# Patient Record
Sex: FEMALE | Race: WHITE | NOT HISPANIC OR LATINO | URBAN - METROPOLITAN AREA
[De-identification: names, ages, dates, MRNs, and addresses within clinical notes are randomized per-mention and may not be internally consistent; named-entity substitution may affect disease eponyms.]

---

## 2020-06-02 ENCOUNTER — EVALUATION (OUTPATIENT)
Dept: PHYSICAL THERAPY | Facility: CLINIC | Age: 20
End: 2020-06-02
Payer: OTHER GOVERNMENT

## 2020-06-02 ENCOUNTER — TRANSCRIBE ORDERS (OUTPATIENT)
Dept: PHYSICAL THERAPY | Facility: CLINIC | Age: 20
End: 2020-06-02

## 2020-06-02 DIAGNOSIS — S46.011D STRAIN OF TENDON OF RIGHT ROTATOR CUFF, SUBSEQUENT ENCOUNTER: Primary | ICD-10-CM

## 2020-06-02 PROCEDURE — 97161 PT EVAL LOW COMPLEX 20 MIN: CPT | Performed by: PHYSICAL THERAPIST

## 2020-06-02 PROCEDURE — 97110 THERAPEUTIC EXERCISES: CPT | Performed by: PHYSICAL THERAPIST

## 2020-06-05 ENCOUNTER — OFFICE VISIT (OUTPATIENT)
Dept: PHYSICAL THERAPY | Facility: CLINIC | Age: 20
End: 2020-06-05
Payer: OTHER GOVERNMENT

## 2020-06-05 DIAGNOSIS — S46.011D STRAIN OF TENDON OF RIGHT ROTATOR CUFF, SUBSEQUENT ENCOUNTER: Primary | ICD-10-CM

## 2020-06-05 PROCEDURE — 97110 THERAPEUTIC EXERCISES: CPT | Performed by: PHYSICAL THERAPIST

## 2020-06-10 ENCOUNTER — OFFICE VISIT (OUTPATIENT)
Dept: PHYSICAL THERAPY | Facility: CLINIC | Age: 20
End: 2020-06-10
Payer: OTHER GOVERNMENT

## 2020-06-10 DIAGNOSIS — S46.011D STRAIN OF TENDON OF RIGHT ROTATOR CUFF, SUBSEQUENT ENCOUNTER: Primary | ICD-10-CM

## 2020-06-10 PROCEDURE — 97110 THERAPEUTIC EXERCISES: CPT | Performed by: PHYSICAL THERAPIST

## 2020-06-12 ENCOUNTER — APPOINTMENT (OUTPATIENT)
Dept: PHYSICAL THERAPY | Facility: CLINIC | Age: 20
End: 2020-06-12
Payer: OTHER GOVERNMENT

## 2020-06-15 ENCOUNTER — OFFICE VISIT (OUTPATIENT)
Dept: PHYSICAL THERAPY | Facility: CLINIC | Age: 20
End: 2020-06-15
Payer: OTHER GOVERNMENT

## 2020-06-15 DIAGNOSIS — S46.011D STRAIN OF TENDON OF RIGHT ROTATOR CUFF, SUBSEQUENT ENCOUNTER: Primary | ICD-10-CM

## 2020-06-15 PROCEDURE — 97110 THERAPEUTIC EXERCISES: CPT | Performed by: PHYSICAL THERAPIST

## 2020-06-19 ENCOUNTER — OFFICE VISIT (OUTPATIENT)
Dept: PHYSICAL THERAPY | Facility: CLINIC | Age: 20
End: 2020-06-19
Payer: OTHER GOVERNMENT

## 2020-06-19 DIAGNOSIS — S46.011D STRAIN OF TENDON OF RIGHT ROTATOR CUFF, SUBSEQUENT ENCOUNTER: Primary | ICD-10-CM

## 2020-06-19 PROCEDURE — 97110 THERAPEUTIC EXERCISES: CPT | Performed by: PHYSICAL THERAPIST

## 2020-06-23 ENCOUNTER — OFFICE VISIT (OUTPATIENT)
Dept: PHYSICAL THERAPY | Facility: CLINIC | Age: 20
End: 2020-06-23
Payer: OTHER GOVERNMENT

## 2020-06-23 DIAGNOSIS — S46.011D STRAIN OF TENDON OF RIGHT ROTATOR CUFF, SUBSEQUENT ENCOUNTER: Primary | ICD-10-CM

## 2020-06-23 PROCEDURE — 97110 THERAPEUTIC EXERCISES: CPT | Performed by: PHYSICAL THERAPIST

## 2020-06-25 ENCOUNTER — OFFICE VISIT (OUTPATIENT)
Dept: PHYSICAL THERAPY | Facility: CLINIC | Age: 20
End: 2020-06-25
Payer: OTHER GOVERNMENT

## 2020-06-25 DIAGNOSIS — S46.011D STRAIN OF TENDON OF RIGHT ROTATOR CUFF, SUBSEQUENT ENCOUNTER: Primary | ICD-10-CM

## 2020-06-25 PROCEDURE — 97110 THERAPEUTIC EXERCISES: CPT | Performed by: PHYSICAL THERAPIST

## 2020-06-30 ENCOUNTER — OFFICE VISIT (OUTPATIENT)
Dept: PHYSICAL THERAPY | Facility: CLINIC | Age: 20
End: 2020-06-30
Payer: OTHER GOVERNMENT

## 2020-06-30 DIAGNOSIS — S46.011D STRAIN OF TENDON OF RIGHT ROTATOR CUFF, SUBSEQUENT ENCOUNTER: Primary | ICD-10-CM

## 2020-06-30 PROCEDURE — 97110 THERAPEUTIC EXERCISES: CPT | Performed by: PHYSICAL THERAPIST

## 2020-07-02 ENCOUNTER — OFFICE VISIT (OUTPATIENT)
Dept: PHYSICAL THERAPY | Facility: CLINIC | Age: 20
End: 2020-07-02
Payer: OTHER GOVERNMENT

## 2020-07-02 DIAGNOSIS — S46.011D STRAIN OF TENDON OF RIGHT ROTATOR CUFF, SUBSEQUENT ENCOUNTER: Primary | ICD-10-CM

## 2020-07-02 PROCEDURE — 97110 THERAPEUTIC EXERCISES: CPT | Performed by: PHYSICAL THERAPIST

## 2020-07-02 NOTE — PROGRESS NOTES
Daily Note     Today's date: 2020  Patient name: Ramón Mcgraw  : 2000  MRN: [id number]  Referring provider: Juancho Bower MD  Dx:   Encounter Diagnosis     ICD-10-CM    1  Strain of tendon of right rotator cuff, subsequent encounter S46 011D                   Subjective: Harrison Mejia tells me that his week was the best week she has had with her shoulder (as far as pain go) since starting therapy  She reports HEP compliance  Objective: See treatment diary below  Progressed UE strengthening program         Assessment: Responding well to current plan     Plan: Continue per plan of care  Likely one more week of therapy         Precautions: none      Manuals  6/10 6/15 6/19 6/23 6/25 6/30 7/2                                                        Neuro Re-Ed                                                                                                        Ther Ex             UBE NV 3/3 3/3 3/3 3/3 L4 5 3/3  L5 3/3  L6 3/3  L6 3/3  L6    Scap 4 with Band NV Green  2 x 10  3sec Green  3 x 10 Blue 2 x 15 Blue  2 x 15 Blue  2 x 15 Blue  2 x 15 Blue  2 x 15 Blue  2 x 15                 Prone rhomboids NV 3 x 10 Over Pball  3 x 10 1#  Pball  2 x 15 1#Pball  2 x 15 1# Pball  3 x 10 1#Pball  3 x 10 1#  Pball  2 x 15 2#  2 x 15    Prone lower traps NV 3 x 10 Over Pball  3 x 10 1#  Pball  2 x 15 1#Pball  2 x 15 1#  Pball  3 x 10 1#  Pball  3 x 10 1#  Pball  2 x 15 2#  2 x 15    SA Punchups NV 20#  2 x 20 10#  2 x 20 10#  2 x 20 15#  2 x 20 15#  2 x 20 15#  2 x 20 20#  2 x 20 20#  2 x 20    Rhythmic stabilizations  FF/EXT, Horiz ABD/ADD NV 3 x 10 3 x 10 3 x 10 3 x 10 3 x 10 3 x 10 3 x 10     SL shoulder flexion (slow)  2 x 10 1#  2 x 15 1#  2 x 15 2#  2 x 10 2#  2 x 15 2#  2 x 15 2#  3 x 10 2#  3 x 10    SL ER  2#  2 x 15 2#  2 x 15 2#  2 x 15 3#  2 x 10 3#  3 x 10 3#  3 x 10 4#  3 x 10 4#  3 z 10    Manual resisted supine PNF D2 flexion/extension   3 x 10 3 x 10 3 x 10 3 x 10 3 x 10 3 x 10     Tband IR   Green  X 30 Blue x 30 Blue  X 30 Blue  X 30 Blue  3 x 10 Blue 2 x 15 Blue  2 x 15    Tband ER   Green x 30 Blue x 30 Blue x 30 Blue   x 30 Blue  3 x 10 Blue  2 x 15 Blue  2 x 15    Body blade (H/V @ 90 flexion)   Small:  15sec x 3 Large:  15sec x 3 Large 15sec x 4 Large  15sec x 4 Large  15sec x 4 Large 15  sec x 4 Large 15sec x 4    SA wall walks   Orange  X 10 Orange   x 10 Orange  X 1 0 Orange   x 10 Orange x 10 Pink  2 x 10 Pink  2 x 10    Ball circles @ wall   1# on wrist  3 x 10 1 5# on wrist  20cw/ccw x 4 1 5#  On wrist   20cw/ccw x 4 1 5# on wrist  20/cw/ccw x 3 2#   on wrist 20cw/ccw x 3 23 on wrist, 20cw/ccw x 3 2 5# on wirst  20cw/ccw x 3    AROM scaption   1#  3 x 10 2#  3x 10 3#  3 x 10 3#  3 x 10 3#  3 x 10 3#  3 x 10 4#  3 x 10    Jose PARADISE ER With 3 side steps     6 0   x 10 6 0 x 10 min 6 5   2 x10 7 0  2 x 10 7 5  2 x 10    BOSU wall pushups      NV 2 x 20 2 x 20 2 x 20                 Ther Activity                                       IE/HEP RG                                      Modalities             Cryo R shoulder 10 min 10 min 10 min 10 min 10 min 10 min 10 min 10 min

## 2020-07-07 ENCOUNTER — OFFICE VISIT (OUTPATIENT)
Dept: PHYSICAL THERAPY | Facility: CLINIC | Age: 20
End: 2020-07-07
Payer: OTHER GOVERNMENT

## 2020-07-07 ENCOUNTER — TRANSCRIBE ORDERS (OUTPATIENT)
Dept: PHYSICAL THERAPY | Facility: CLINIC | Age: 20
End: 2020-07-07

## 2020-07-07 DIAGNOSIS — S46.011D STRAIN OF TENDON OF RIGHT ROTATOR CUFF, SUBSEQUENT ENCOUNTER: Primary | ICD-10-CM

## 2020-07-07 PROCEDURE — 97110 THERAPEUTIC EXERCISES: CPT | Performed by: PHYSICAL THERAPIST

## 2020-07-07 NOTE — LETTER
2020    MD Zackary [address]orman [address] [place] [address]    Patient: Guillermina Blandon   YOB: 2000   Date of Visit: 2020     Encounter Diagnosis     ICD-10-CM    1  Strain of tendon of right rotator cuff, subsequent encounter S46 011D        Dear Dr Anh Rivera: Thank you for your recent referral of Guillermina Blandon  Please review the attached evaluation summary from Sprague's recent visit  Please verify that you agree with the plan of care by signing the attached order  If you have any questions or concerns, please do not hesitate to call  I sincerely appreciate the opportunity to share in the care of one of your patients and hope to have another opportunity to work with you in the near future  Sincerely,    Beck Mcmanus, PT      Referring Provider:      I certify that I have read the below Plan of Care and certify the need for these services furnished under this plan of treatment while under my care  MD Zackary Gorman [address] [facility] 109: [phone]                                                       Discharge  Today's date: 2020  Patient name: Guillermina Blandon  : 2000  MRN: [id number]  Referring provider: Juwan Arrington MD  Dx:   Encounter Diagnosis     ICD-10-CM    1  Strain of tendon of right rotator cuff, subsequent encounter S46 011D                Assessment  Assessment details: Patient is a 22 y o  female with a diagnosis of R shoulder secondary rotator cuff impingement with possible  labral pathology  Since starting therapy pt has made the following progress towards goals:  1) Decreased pain  2) Improved range of motion  3) Improved strength  4) Improved self rated functional score(FOTO score improved to 75 from 63 at time of IE)    Making good progress, still with some likely functional weakness which she will address with continued exercise   I advised patient to continue with home exercise program which I reviewed with them today  Advised patient to contact me with any future questions or concerns regarding exercise  Pt is in agreement with discharge plan  Pt will be returning to school at the end of the week  Understanding of Dx/Px/POC: excellent  Goals  Short Term Goals:  1) Pain : Decrease R shoulder pain to 2/10 at worst x 1 continuous week within 2-3 weeks  2) AROM: Improve R shoulder AROM flexion to al least 170 degrees within 2-3 weeks  3) Strength: Improved UE strength by at least 1/2 grade for all noted as weak within 2-3 weeks  4) Function: Improved FOTO score from IE within 2-3 weeks (63 at time of IE), patient to note greater use of arm for ADLs within 2-3 weeks  LongTerm Goals:  1) Pain : Eliminate R shoulder pain  x 1 continuous week within 4-6 weeks  2) Strength: Improved R  UE strength to 5/5 within 6 weeks  3) Function: Improved FOTO score to at least  77 ; no reported difficulty with ADLs within 6 weeks  [place] 4) (I) with HEP within 6 weeks  Plan  Patient would benefit from: skilled PT  Planned modality interventions: cryotherapy, TENS, thermotherapy: hydrocollator packs and ultrasound  Planned therapy interventions: ADL training, body mechanics training, functional ROM exercises, home exercise program, joint mobilization, manual therapy, neuromuscular re-education, patient education, postural training, strengthening, stretching, therapeutic activities and therapeutic exercise  Frequency: 2-3 x's per week x 6 weeks  Plan of Care beginning date: 6/2/2020  Plan of Care expiration date: 7/14/2020  Treatment plan discussed with: patient        Subjective Evaluation    History of Present Illness  Mechanism of injury: Patient is a 22 y o  old female who presents for an initial outpatient physical therapy consultation regarding their R shoulder      Pain started about four years ago 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  Off and on since that time  Candy Garcia currently attend [employer]  Was doing a combating class last semester and pain started to worsen, then ran an obstacle course test in late February and she felt like shoulder gave out while on monkey bars  Consistent pain since that time  MRI + for inflammation around labrum without clear large tear per MD note  Received injection from Dr Analia Monahan which has been helpful  Npw referred for course of therapy  Pain is localized anteriorly  UPDATE 20: "Getting better " Noting near resolution of R shoulder pain over the past two weeks  Feeling stronger  Still has some concerns about how well she will be able to do pushups with return to physical testing/training in [employer]         Pain  Current pain ratin  At best pain ratin  At worst pain ratin    Social Support    Employment status: not working  Hand dominance: left    Patient Goals: making good progress towards personal goals  Patient goals for therapy: decreased pain, increased strength and independence with ADLs/IADLs          Objective     Tenderness     Right Shoulder  NO Tenderness in the biceps tendon (proximal) or supraspinatus tendon  Active Range of Motion       Right Shoulder   Flexion: 170 degrees  Abduction: 170 degrees   External rotation: 90 degrees  Internal rotation: T7     Passive Range of Motion   R shoulder: Full and pain free     Strength/Myotome Testing     Right Shoulder     Planes of Motion   Flexion: 5   Extension: 5   Abduction: 5   Adduction: 5   External rotation at 0°:  5  Internal rotation at 0°: 5     Isolated Muscles   Biceps: 5   Lower trapezius: 5  Rhomboids: 5 (pain)   Triceps: 5     Tests     Right Shoulder   Negative crossover, empty can and Speed's  Negative apprehension, drop arm and lift-off                Precautions: none       Precautions: none      Manuals 6/2 6/5 6/10 6/15 6/19 6/23 6/25 6/30 7/2 7/6 Neuro Re-Ed                                                                                                        Ther Ex             UBE NV 3/3 3/3 3/3 3/3 L4 5 3/3  L5 3/3  L6 3/3  L6 3/3  L6 3/3  L6   Scap 4 with Band NV Green  2 x 10  3sec Green  3 x 10 Blue 2 x 15 Blue  2 x 15 Blue  2 x 15 Blue  2 x 15 Blue  2 x 15 Blue  2 x 15 Blue  2 x 15                Prone rhomboids NV 3 x 10 Over Pball  3 x 10 1#  Pball  2 x 15 1#Pball  2 x 15 1# Pball  3 x 10 1#Pball  3 x 10 1#  Pball  2 x 15 2#  2 x 15 2#  2 x 15   Prone lower traps NV 3 x 10 Over Pball  3 x 10 1#  Pball  2 x 15 1#Pball  2 x 15 1#  Pball  3 x 10 1#  Pball  3 x 10 1#  Pball  2 x 15 2#  2 x 15 2#  2 x 15   SA Punchups NV 20#  2 x 20 10#  2 x 20 10#  2 x 20 15#  2 x 20 15#  2 x 20 15#  2 x 20 20#  2 x 20 20#  2 x 20 20#  2 x 20   Rhythmic stabilizations  FF/EXT, Horiz ABD/ADD NV 3 x 10 3 x 10 3 x 10 3 x 10 3 x 10 3 x 10 3 x 10     SL shoulder flexion (slow)  2 x 10 1#  2 x 15 1#  2 x 15 2#  2 x 10 2#  2 x 15 2#  2 x 15 2#  3 x 10 2#  3 x 10 2#  2 x 15   SL ER  2#  2 x 15 2#  2 x 15 2#  2 x 15 3#  2 x 10 3#  3 x 10 3#  3 x 10 4#  3 x 10 4#  3 z 10 4#  3 x 10   Manual resisted supine PNF D2 flexion/extension   3 x 10 3 x 10 3 x 10 3 x 10 3 x 10 3 x 10     Tband IR   Green  X 30 Blue x 30 Blue  X 30 Blue  X 30 Blue  3 x 10 Blue 2 x 15 Blue  2 x 15 Blue  2 x 15   Tband ER   Green x 30 Blue x 30 Blue x 30 Blue   x 30 Blue  3 x 10 Blue  2 x 15 Blue  2 x 15 Blue  2 x 15   Body blade (H/V @ 90 flexion)   Small:  15sec x 3 Large:  15sec x 3 Large 15sec x 4 Large  15sec x 4 Large  15sec x 4 Large 15  sec x 4 Large 15sec x 4 Large  15sec x 4   SA wall walks   Orange  X 10 Orange   x 10 Orange  X 1 0 Orange   x 10 Orange x 10 Pink  2 x 10 Pink  2 x 10 Pink  2 x 10   Ball circles @ wall   1# on wrist  3 x 10 1 5# on wrist  20cw/ccw x 4 1 5#  On wrist   20cw/ccw x 4 1 5# on wrist  20/cw/ccw x 3 2#   on wrist 20cw/ccw x 3 23 on wrist, 20cw/ccw x 3 2 5# on wirst  20cw/ccw x 3 2 5# on wrist  20cw/ccw x  4   AROM scaption   1#  3 x 10 2#  3x 10 3#  3 x 10 3#  3 x 10 3#  3 x 10 3#  3 x 10 4#  3 x 10 4#  3 x 10   [place] PARADISE ER With 3 side steps     6 0   x 10 6 0 x 10 min 6 5   2 x10 7 0  2 x 10 7 5  2 x 10 7 5  2 x 10   BOSU wall pushups      NV 2 x 20 2 x 20 2 x 20 2 x 20                Ther Activity                                       IE/HEP RG                                      Modalities             Cryo R shoulder 10 min 10 min 10 min 10 min 10 min 10 min 10 min 10 min  10 min

## 2020-07-07 NOTE — PROGRESS NOTES
Discharge  Today's date: 2020  Patient name: Mecca Chandler  : 2000  MRN: [id number]  Referring provider: Dean Clifford MD  Dx:   Encounter Diagnosis     ICD-10-CM    1  Strain of tendon of right rotator cuff, subsequent encounter S46 011D                Assessment  Assessment details: Patient is a 22 y o  female with a diagnosis of R shoulder secondary rotator cuff impingement with possible  labral pathology  Since starting therapy pt has made the following progress towards goals:  1) Decreased pain  2) Improved range of motion  3) Improved strength  4) Improved self rated functional score(FOTO score improved to 75 from 63 at time of IE)    Making good progress, still with some likely functional weakness which she will address with continued exercise  I advised patient to continue with home exercise program which I reviewed with them today  Advised patient to contact me with any future questions or concerns regarding exercise  Pt is in agreement with discharge plan  Pt will be returning to school at the end of the week  Understanding of Dx/Px/POC: excellent  Goals  Short Term Goals:  1) Pain : Decrease R shoulder pain to 2/10 at worst x 1 continuous week within 2-3 weeks  2) AROM: Improve R shoulder AROM flexion to al least 170 degrees within 2-3 weeks  3) Strength: Improved UE strength by at least 1/2 grade for all noted as weak within 2-3 weeks  4) Function: Improved FOTO score from IE within 2-3 weeks (63 at time of IE), patient to note greater use of arm for ADLs within 2-3 weeks  LongTerm Goals:  1) Pain : Eliminate R shoulder pain  x 1 continuous week within 4-6 weeks  2) Strength: Improved R  UE strength to 5/5 within 6 weeks  3) Function: Improved FOTO score to at least  77 ; no reported difficulty with ADLs within 6 weeks  [address] [place] 4) (I) with HEP within 6 weeks          Plan  Patient would benefit from: skilled PT  Planned modality interventions: cryotherapy, TENS, thermotherapy: hydrocollator packs and ultrasound  Planned therapy interventions: ADL training, body mechanics training, functional ROM exercises, home exercise program, joint mobilization, manual therapy, neuromuscular re-education, patient education, postural training, strengthening, stretching, therapeutic activities and therapeutic exercise  Frequency: 2-3 x's per week x 6 weeks  Plan of Care beginning date: 2020  Plan of Care expiration date: 2020  Treatment plan discussed with: patient        Subjective Evaluation    History of Present Illness  Mechanism of injury: Patient is a 22 y o  old female who presents for an initial outpatient physical therapy consultation regarding their R shoulder  Pain started about four years ago 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  Off and on since that time  Rose Pisano currently attend [employer]  Was doing a combating class last semester and pain started to worsen, then ran an obstacle course test in late February and she felt like shoulder gave out while on monkey bars  Consistent pain since that time  MRI + for inflammation around labrum without clear large tear per MD note  Received injection from Dr Claretta Lesches which has been helpful  Npw referred for course of therapy  Pain is localized anteriorly  UPDATE 20: "Getting better " Noting near resolution of R shoulder pain over the past two weeks  Feeling stronger  Still has some concerns about how well she will be able to do pushups with return to physical testing/training in [employer]         Pain  Current pain ratin  At best pain ratin  At worst pain ratin    Social Support    Employment status: not working  Hand dominance: left    Patient Goals: making good progress towards personal goals    Patient goals for therapy: decreased pain, increased strength and independence with ADLs/IADLs          Objective Tenderness     Right Shoulder  NO Tenderness in the biceps tendon (proximal) or supraspinatus tendon  Active Range of Motion       Right Shoulder   Flexion: 170 degrees  Abduction: 170 degrees   External rotation: 90 degrees  Internal rotation: T7     Passive Range of Motion   R shoulder: Full and pain free     Strength/Myotome Testing     Right Shoulder     Planes of Motion   Flexion: 5   Extension: 5   Abduction: 5   Adduction: 5   External rotation at 0°: 5  Internal rotation at 0°: 5     Isolated Muscles   Biceps: 5   Lower trapezius: 5  Rhomboids: 5 (pain)   Triceps: 5     Tests     Right Shoulder   Negative crossover, empty can and Speed's  Negative apprehension, drop arm and lift-off                Precautions: none       Precautions: none      Manuals 6/2 6/5 6/10 6/15 6/19 6/23 6/25 6/30 7/2 7/6                                                       Neuro Re-Ed                                                                                                        Ther Ex             UBE NV 3/3 3/3 3/3 3/3 L4 5 3/3  L5 3/3  L6 3/3  L6 3/3  L6 3/3  L6   Scap 4 with Band NV Green  2 x 10  3sec Green  3 x 10 Blue 2 x 15 Blue  2 x 15 Blue  2 x 15 Blue  2 x 15 Blue  2 x 15 Blue  2 x 15 Blue  2 x 15                Prone rhomboids NV 3 x 10 Over Pball  3 x 10 1#  Pball  2 x 15 1#Pball  2 x 15 1# Pball  3 x 10 1#Pball  3 x 10 1#  Pball  2 x 15 2#  2 x 15 2#  2 x 15   Prone lower traps NV 3 x 10 Over Pball  3 x 10 1#  Pball  2 x 15 1#Pball  2 x 15 1#  Pball  3 x 10 1#  Pball  3 x 10 1#  Pball  2 x 15 2#  2 x 15 2#  2 x 15   SA Punchups NV 20#  2 x 20 10#  2 x 20 10#  2 x 20 15#  2 x 20 15#  2 x 20 15#  2 x 20 20#  2 x 20 20#  2 x 20 20#  2 x 20   Rhythmic stabilizations  FF/EXT, Horiz ABD/ADD NV 3 x 10 3 x 10 3 x 10 3 x 10 3 x 10 3 x 10 3 x 10     SL shoulder flexion (slow)  2 x 10 1#  2 x 15 1#  2 x 15 2#  2 x 10 2#  2 x 15 2#  2 x 15 2#  3 x 10 2#  3 x 10 2#  2 x 15   SL ER  2#  2 x 15 2#  2 x 15 2#  2 x 15 3#  2 x 10 3#  3 x 10 3#  3 x 10 4#  3 x 10 4#  3 z 10 4#  3 x 10   Manual resisted supine PNF D2 flexion/extension   3 x 10 3 x 10 3 x 10 3 x 10 3 x 10 3 x 10     Tband IR   Green  X 30 Blue x 30 Blue  X 30 Blue  X 30 Blue  3 x 10 Blue 2 x 15 Blue  2 x 15 Blue  2 x 15   Tband ER   Green x 30 Blue x 30 Blue x 30 Blue   x 30 Blue  3 x 10 Blue  2 x 15 Blue  2 x 15 Blue  2 x 15   Body blade (H/V @ 90 flexion)   Small:  15sec x 3 Large:  15sec x 3 Large 15sec x 4 Large  15sec x 4 Large  15sec x 4 Large 15  sec x 4 Large 15sec x 4 Large  15sec x 4   SA wall walks   Orange  X 10 Orange   x 10 Orange  X 1 0 Orange   x 10 Orange x 10 Pink  2 x 10 Pink  2 x 10 Pink  2 x 10   Ball circles @ wall   1# on wrist  3 x 10 1 5# on wrist  20cw/ccw x 4 1 5#  On wrist   20cw/ccw x 4 1 5# on wrist  20/cw/ccw x 3 2#   on wrist 20cw/ccw x 3 23 on wrist, 20cw/ccw x 3 2 5# on wirst  20cw/ccw x 3 2 5# on wrist  20cw/ccw x  4   AROM scaption   1#  3 x 10 2#  3x 10 3#  3 x 10 3#  3 x 10 3#  3 x 10 3#  3 x 10 4#  3 x 10 4#  3 x 10   Jose PARADISE ER With 3 side steps     6 0   x 10 6 0 x 10 min 6 5   2 x10 7 0  2 x 10 7 5  2 x 10 7 5  2 x 10   BOSU wall pushups      NV 2 x 20 2 x 20 2 x 20 2 x 20                Ther Activity                                       IE/HEP RG                                      Modalities             Cryo R shoulder 10 min 10 min 10 min 10 min 10 min 10 min 10 min 10 min  10 min

## 2021-01-07 ENCOUNTER — NURSE TRIAGE (OUTPATIENT)
Dept: OTHER | Facility: OTHER | Age: 21
End: 2021-01-07

## 2021-01-07 DIAGNOSIS — Z20.828 SARS-ASSOCIATED CORONAVIRUS EXPOSURE: ICD-10-CM

## 2021-01-07 DIAGNOSIS — Z20.828 SARS-ASSOCIATED CORONAVIRUS EXPOSURE: Primary | ICD-10-CM

## 2021-01-07 PROCEDURE — U0003 INFECTIOUS AGENT DETECTION BY NUCLEIC ACID (DNA OR RNA); SEVERE ACUTE RESPIRATORY SYNDROME CORONAVIRUS 2 (SARS-COV-2) (CORONAVIRUS DISEASE [COVID-19]), AMPLIFIED PROBE TECHNIQUE, MAKING USE OF HIGH THROUGHPUT TECHNOLOGIES AS DESCRIBED BY CMS-2020-01-R: HCPCS | Performed by: FAMILY MEDICINE

## 2021-01-07 PROCEDURE — U0005 INFEC AGEN DETEC AMPLI PROBE: HCPCS | Performed by: FAMILY MEDICINE

## 2021-01-07 NOTE — TELEPHONE ENCOUNTER
Reason for Disposition   [1] COVID-19 EXPOSURE (Close Contact) AND [2] within last 14 days BUT [3] NO symptoms    Protocols used: CORONAVIRUS (COVID-19 ) EXPOSURE-ADULT-[place]

## 2021-01-07 NOTE — TELEPHONE ENCOUNTER
Regarding: Covid   ----- Message from Brittany Nam sent at 1/7/2021  1:05 PM EST -----  Pt has a script from the [employer] of Defense and is looking to get tested  She has no symptoms or exposure but claims her order from the [employer]   Should be enough  I was not sure how to handle this

## 2021-01-07 NOTE — TELEPHONE ENCOUNTER
1  Were you within 6 feet or less, for up to 15 minutes or more with a person that has a confirmed COVID-19 test?   No known exposure  2  What was the date of your exposure? N/A  3  Are you experiencing any symptoms attributed to the virus?  (Assess for SOB, cough, fever, difficulty breathing) Asymptomatic  4  HIGH RISK: Do you have any history heart or lung conditions, weakened immune system, diabetes, Asthma, CHF, HIV, COPD, Chemo, renal failure, sickle cell, etc? Denied  5  PREGNANCY: Are you pregnant or did you recently give birth? Denied    Patient is a student at [employer] and needs a COVID-19 test in order to return to school on 1/11/21  She has an order from the [employer] and is having trouble finding a testing site that will accept the order

## 2021-01-08 LAB — SARS-COV-2 RNA SPEC QL NAA+PROBE: NOT DETECTED
